# Patient Record
Sex: FEMALE | Race: OTHER | HISPANIC OR LATINO | URBAN - METROPOLITAN AREA
[De-identification: names, ages, dates, MRNs, and addresses within clinical notes are randomized per-mention and may not be internally consistent; named-entity substitution may affect disease eponyms.]

---
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
## 2019-09-25 ENCOUNTER — EMERGENCY (EMERGENCY)
Facility: HOSPITAL | Age: 19
LOS: 1 days | Discharge: HOME | End: 2019-09-25
Attending: EMERGENCY MEDICINE
Payer: SELF-PAY

## 2019-09-25 VITALS — HEIGHT: 61 IN | WEIGHT: 139.99 LBS

## 2019-09-25 VITALS
DIASTOLIC BLOOD PRESSURE: 55 MMHG | HEART RATE: 62 BPM | SYSTOLIC BLOOD PRESSURE: 109 MMHG | TEMPERATURE: 99 F | RESPIRATION RATE: 16 BRPM | OXYGEN SATURATION: 98 %

## 2019-09-25 LAB
ALBUMIN SERPL ELPH-MCNC: 4.8 G/DL — SIGNIFICANT CHANGE UP (ref 3.3–5.2)
ALP SERPL-CCNC: 63 U/L — SIGNIFICANT CHANGE UP (ref 40–120)
ALT FLD-CCNC: 12 U/L — SIGNIFICANT CHANGE UP
ANION GAP SERPL CALC-SCNC: 11 MMOL/L — SIGNIFICANT CHANGE UP (ref 5–17)
APPEARANCE UR: CLEAR — SIGNIFICANT CHANGE UP
APTT BLD: 27.7 SEC — SIGNIFICANT CHANGE UP (ref 27.5–36.3)
AST SERPL-CCNC: 19 U/L — SIGNIFICANT CHANGE UP
BACTERIA # UR AUTO: ABNORMAL
BASOPHILS # BLD AUTO: 0.07 K/UL — SIGNIFICANT CHANGE UP (ref 0–0.2)
BASOPHILS NFR BLD AUTO: 0.5 % — SIGNIFICANT CHANGE UP (ref 0–2)
BILIRUB SERPL-MCNC: 0.3 MG/DL — LOW (ref 0.4–2)
BILIRUB UR-MCNC: NEGATIVE — SIGNIFICANT CHANGE UP
BUN SERPL-MCNC: 12 MG/DL — SIGNIFICANT CHANGE UP (ref 8–20)
CALCIUM SERPL-MCNC: 9.3 MG/DL — SIGNIFICANT CHANGE UP (ref 8.6–10.2)
CHLORIDE SERPL-SCNC: 105 MMOL/L — SIGNIFICANT CHANGE UP (ref 98–107)
CO2 SERPL-SCNC: 24 MMOL/L — SIGNIFICANT CHANGE UP (ref 22–29)
COLOR SPEC: YELLOW — SIGNIFICANT CHANGE UP
CREAT SERPL-MCNC: 0.48 MG/DL — LOW (ref 0.5–1.3)
DIFF PNL FLD: ABNORMAL
EOSINOPHIL # BLD AUTO: 0.02 K/UL — SIGNIFICANT CHANGE UP (ref 0–0.5)
EOSINOPHIL NFR BLD AUTO: 0.1 % — SIGNIFICANT CHANGE UP (ref 0–6)
EPI CELLS # UR: SIGNIFICANT CHANGE UP
GLUCOSE SERPL-MCNC: 99 MG/DL — SIGNIFICANT CHANGE UP (ref 70–115)
GLUCOSE UR QL: NEGATIVE MG/DL — SIGNIFICANT CHANGE UP
HCG SERPL-ACNC: <4 MIU/ML — SIGNIFICANT CHANGE UP
HCT VFR BLD CALC: 39.5 % — SIGNIFICANT CHANGE UP (ref 34.5–45)
HGB BLD-MCNC: 12.9 G/DL — SIGNIFICANT CHANGE UP (ref 11.5–15.5)
IMM GRANULOCYTES NFR BLD AUTO: 0.4 % — SIGNIFICANT CHANGE UP (ref 0–1.5)
INR BLD: 1.09 RATIO — SIGNIFICANT CHANGE UP (ref 0.88–1.16)
KETONES UR-MCNC: NEGATIVE — SIGNIFICANT CHANGE UP
LEUKOCYTE ESTERASE UR-ACNC: NEGATIVE — SIGNIFICANT CHANGE UP
LYMPHOCYTES # BLD AUTO: 1.5 K/UL — SIGNIFICANT CHANGE UP (ref 1–3.3)
LYMPHOCYTES # BLD AUTO: 10.9 % — LOW (ref 13–44)
MCHC RBC-ENTMCNC: 26 PG — LOW (ref 27–34)
MCHC RBC-ENTMCNC: 32.7 GM/DL — SIGNIFICANT CHANGE UP (ref 32–36)
MCV RBC AUTO: 79.5 FL — LOW (ref 80–100)
MONOCYTES # BLD AUTO: 0.99 K/UL — HIGH (ref 0–0.9)
MONOCYTES NFR BLD AUTO: 7.2 % — SIGNIFICANT CHANGE UP (ref 2–14)
NEUTROPHILS # BLD AUTO: 11.09 K/UL — HIGH (ref 1.8–7.4)
NEUTROPHILS NFR BLD AUTO: 80.9 % — HIGH (ref 43–77)
NITRITE UR-MCNC: NEGATIVE — SIGNIFICANT CHANGE UP
PH UR: 6 — SIGNIFICANT CHANGE UP (ref 5–8)
PLATELET # BLD AUTO: 245 K/UL — SIGNIFICANT CHANGE UP (ref 150–400)
POTASSIUM SERPL-MCNC: 3.5 MMOL/L — SIGNIFICANT CHANGE UP (ref 3.5–5.3)
POTASSIUM SERPL-SCNC: 3.5 MMOL/L — SIGNIFICANT CHANGE UP (ref 3.5–5.3)
PROT SERPL-MCNC: 8 G/DL — SIGNIFICANT CHANGE UP (ref 6.6–8.7)
PROT UR-MCNC: 30 MG/DL
PROTHROM AB SERPL-ACNC: 12.6 SEC — SIGNIFICANT CHANGE UP (ref 10–12.9)
RBC # BLD: 4.97 M/UL — SIGNIFICANT CHANGE UP (ref 3.8–5.2)
RBC # FLD: 15.5 % — HIGH (ref 10.3–14.5)
RBC CASTS # UR COMP ASSIST: SIGNIFICANT CHANGE UP /HPF (ref 0–4)
SODIUM SERPL-SCNC: 140 MMOL/L — SIGNIFICANT CHANGE UP (ref 135–145)
SP GR SPEC: 1.02 — SIGNIFICANT CHANGE UP (ref 1.01–1.02)
UROBILINOGEN FLD QL: NEGATIVE MG/DL — SIGNIFICANT CHANGE UP
WBC # BLD: 13.72 K/UL — HIGH (ref 3.8–10.5)
WBC # FLD AUTO: 13.72 K/UL — HIGH (ref 3.8–10.5)
WBC UR QL: SIGNIFICANT CHANGE UP

## 2019-09-25 PROCEDURE — 12051 INTMD RPR FACE/MM 2.5 CM/<: CPT

## 2019-09-25 PROCEDURE — 99284 EMERGENCY DEPT VISIT MOD MDM: CPT | Mod: 25

## 2019-09-25 PROCEDURE — 71260 CT THORAX DX C+: CPT | Mod: 26

## 2019-09-25 PROCEDURE — 72125 CT NECK SPINE W/O DYE: CPT

## 2019-09-25 PROCEDURE — 74177 CT ABD & PELVIS W/CONTRAST: CPT | Mod: 26

## 2019-09-25 PROCEDURE — 71260 CT THORAX DX C+: CPT

## 2019-09-25 PROCEDURE — 71045 X-RAY EXAM CHEST 1 VIEW: CPT

## 2019-09-25 PROCEDURE — 74177 CT ABD & PELVIS W/CONTRAST: CPT

## 2019-09-25 PROCEDURE — 72170 X-RAY EXAM OF PELVIS: CPT

## 2019-09-25 PROCEDURE — 70450 CT HEAD/BRAIN W/O DYE: CPT

## 2019-09-25 PROCEDURE — 73030 X-RAY EXAM OF SHOULDER: CPT | Mod: 26,LT

## 2019-09-25 PROCEDURE — 80053 COMPREHEN METABOLIC PANEL: CPT

## 2019-09-25 PROCEDURE — 73502 X-RAY EXAM HIP UNI 2-3 VIEWS: CPT

## 2019-09-25 PROCEDURE — 72170 X-RAY EXAM OF PELVIS: CPT | Mod: 26

## 2019-09-25 PROCEDURE — 85730 THROMBOPLASTIN TIME PARTIAL: CPT

## 2019-09-25 PROCEDURE — 81001 URINALYSIS AUTO W/SCOPE: CPT

## 2019-09-25 PROCEDURE — 36415 COLL VENOUS BLD VENIPUNCTURE: CPT

## 2019-09-25 PROCEDURE — 71045 X-RAY EXAM CHEST 1 VIEW: CPT | Mod: 26

## 2019-09-25 PROCEDURE — 85610 PROTHROMBIN TIME: CPT

## 2019-09-25 PROCEDURE — 73030 X-RAY EXAM OF SHOULDER: CPT

## 2019-09-25 PROCEDURE — 70450 CT HEAD/BRAIN W/O DYE: CPT | Mod: 26

## 2019-09-25 PROCEDURE — 85027 COMPLETE CBC AUTOMATED: CPT

## 2019-09-25 PROCEDURE — 73502 X-RAY EXAM HIP UNI 2-3 VIEWS: CPT | Mod: 26,LT

## 2019-09-25 PROCEDURE — 72125 CT NECK SPINE W/O DYE: CPT | Mod: 26

## 2019-09-25 PROCEDURE — 84702 CHORIONIC GONADOTROPIN TEST: CPT

## 2019-09-25 RX ORDER — SODIUM CHLORIDE 9 MG/ML
1000 INJECTION INTRAMUSCULAR; INTRAVENOUS; SUBCUTANEOUS ONCE
Refills: 0 | Status: COMPLETED | OUTPATIENT
Start: 2019-09-25 | End: 2019-09-25

## 2019-09-25 RX ADMIN — SODIUM CHLORIDE 1000 MILLILITER(S): 9 INJECTION INTRAMUSCULAR; INTRAVENOUS; SUBCUTANEOUS at 16:10
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
## 2019-09-25 NOTE — ED PROVIDER NOTE - NS ED ROS FT
Constitutional: (-) fever  (-)chills  (-)sweats  Eyes/ENT: (-) blurry vision, (-) epistaxis  (-)rhinorrhea   (-) sore throat    Cardiovascular: (-) chest pain, (-) palpitations (-) edema   Respiratory: (-) cough, (-) shortness of breath   Gastrointestinal: (+)nausea  (-)vomiting, (-) diarrhea  (-) abdominal pain   :  (-)dysuria, (-)frequency, (-)urgency, (-)hematuria  Musculoskeletal: (-) neck pain, (-) back pain, (+) joint pain  Integumentary: (-) rash, (-) edema  Neurological: (+) headache, (-) altered mental status  (-)LOC

## 2019-09-25 NOTE — ED PROVIDER NOTE - NSFOLLOWUPINSTRUCTIONS_ED_ALL_ED_FT
You are advised to please follow up with your primary care doctor within the next 24 hours and return to the Emergency Department for worsening symptoms or any other concerns.  Your doctor may call [phone] to follow up on the specific results of the tests performed today in the emergency department.
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
## 2019-09-25 NOTE — ED PROVIDER NOTE - PHYSICAL EXAMINATION
Return in 1mo for ear recheck and 3mo for 15mo WCC or sooner as needed    Patient Education     Well-Child Checkup: 12 Months     At this age, your baby may take his or her first steps. Although some babies take their first steps when they are younger and some when they are older.      At the 12-month checkup, the healthcare provider will examine the child and ask how things are going at home. This sheet describes some of what you can expect.  Development and milestones  The healthcare provider will ask questions about your child. He or she will observe your toddler to get an idea of the child’s development. By this visit, your child is likely doing some of the following:  · Pulling up to a standing position  · Moving around while holding on to the couch or other furniture (known as “cruising”)  · Taking steps independently  · Putting objects in and takes them out of a container  · Using the first or pointer finger and thumb to grasp small objects  · Starting to understand what you’re saying  · Saying “Mama” and “Goran”  Feeding tips  At 12 months of age, it’s normal for a child to eat 3 meals and a few snacks each day. If your child doesn’t want to eat, that’s OK. Provide food at mealtime, and your child will eat if and when he or she is hungry. Do not force the child to eat. To help your child eat well:  · Gradually give the child whole milk instead of feeding breastmilk or formula. If you’re breastfeeding, continue or wean as you and your child are ready, but also start giving your child whole milk The dietary fat contained in whole milk is necessary for proper brain development and should be given to toddlers from ages 1 to 2 years.  · Make solids your child’s main source of nutrients. Milk should be thought of as a beverage, not a full meal.  · Begin to replace a bottle with a sippy cup for all liquids. Plan to wean your child off the bottle by 15 months of age.  · Avoid foods your child might choke on. This is  common with foods about the size and shape of the child’s throat. They include sections of hot dogs and sausages, hard candies, nuts, whole grapes, and raw vegetables. Ask the healthcare provider about other foods to avoid.  · At 12 months of age it’s OK to give your child honey.  · Ask the healthcare provider if your baby needs fluoride supplements.  Hygiene tips  · If your child has teeth, gently brush them at least twice a day (such as after breakfast and before bed). Use a small amount of fluoride toothpaste (no larger than a grain of rice) and a baby's toothbrush with soft bristles.   · Ask the healthcare provider when your child should have his or her first dental visit. Most pediatric dentists recommend that the first dental visit should happen within 6 months after the first tooth erupts above the gums, but no later than the child's first birthday.   Sleeping tips  At this age, your child will likely nap around 1 to 3 hours each day, and sleep 10 to 12 hours at night. If your child sleeps more or less than this but seems healthy, it is not a concern. To help your child sleep:  · Get the child used to doing the same things each night before bed. Having a bedtime routine helps your child learn when it’s time to go to sleep. Try to stick to the same bedtime each night.  · Do not put your child to bed with anything to drink.  · Make sure the crib mattress is on the lowest setting. This helps keep your child from pulling up and climbing or falling out of the crib. If your child is still able to climb out of the crib, use a crib tent, put the mattress on the floor, or switch to a toddler bed.   · If getting the child to sleep through the night is a problem, ask the healthcare provider for tips.  Safety tips  As your child becomes more mobile, active supervision is crucial. Always be aware of what your child is doing. An accident can happen in a split second. To keep your baby safe:   · If you have not already done  so, childproof the house. If your toddler is pulling up on furniture or cruising (moving around while holding on to objects), be sure that big pieces, such as cabinets and TVs, are tied down or secured to the wall. Otherwise they may be pulled down on top of the child. Move any items that might hurt the child out of his or her reach. Be aware of items like tablecloths or cords that your baby might pull on. Do a safety check of any area your baby spends time in.  · Protect your toddler from falls with sturdy screens on windows and hare at the tops and bottoms of staircases. Supervise your child on the stairs.  · Don’t let your baby get hold of anything small enough to choke on. This includes toys, solid foods, and items on the floor that the child may find while crawling or cruising. As a rule, an item small enough to fit inside a toilet paper tube can cause a child to choke.  · In the car, always put the child in a rear-facing child safety seat in the back seat. Even if your child weighs more than 20 pounds, he or she should still face backward. In fact, it's safest to face backward until age 2 years. Ask the healthcare provider if you have questions.  · At this age many children become curious around dogs, cats, and other animals. Teach your child to be gentle and cautious with animals. Always supervise the child around animals, even familiar family pets.  · Keep this Poison Control phone number in an easy-to-see place, such as on the refrigerator: 111.572.7025.  Vaccines  Based on recommendations from the CDC, at this visit your child may receive the following vaccines:  · Haemophilus influenzae type b  · Hepatitis A  · Hepatitis B  · Influenza (flu)  · Measles, mumps, and rubella  · Pneumococcus  · Polio  · Varicella (chickenpox)  Choosing shoes  Your 1-year-old may be walking. Now is the time to invest in a good pair of shoes. Here are some tips:  · To make sure you get the right size, ask a  for help  measuring your child’s feet. Don’t buy shoes that are too big, for your child to “grow into.” When shoes don’t fit, walking is harder.  · Look for shoes with soft, flexible soles.  · Avoid high ankles and stiff leather. These can be uncomfortable and can interfere with walking.  · Choose shoes that are easy to get on and off, yet won’t slide off your child’s feet accidentally. Moccasins or sneakers with Velcro closures are good choices.        Next checkup at: _______________________________     PARENT NOTES:  Date Last Reviewed: 12/1/2016  © 1329-0372 [employer]. [address] [place] [address], [place] [address]. All rights reserved. This information is not intended as a substitute for professional medical care. Always follow your healthcare professional's instructions.            General:     NAD, well-nourished, well-appearing  Head:     NC/AT, EOMI, oral mucosa moist  Neck:     trachea midline  Lungs:     CTA b/l, no w/r/r  CVS:     S1S2, RRR, no m/g/r  Abd:     +BS, s/nt/nd, no organomegaly  BACK: nt midline @ c/t/l/s spine.     Ext:    2+ radial and pedal pulses    L LE:  ttp @ left hip, from/nt @ left knee, ankle.    R LE: from/nt @ hip, knee, ankle    L UE: ttp @ left shoulder, from.  from/nt @knee, ankle.    R UE: from/nt @ right shoulder, elbow, wrist.   Neuro: AAOx3, no sensory/motor deficits

## 2019-09-25 NOTE — ED PROVIDER NOTE - PATIENT PORTAL LINK FT
You can access the FollowMyHealth Patient Portal offered by [facility] by registering at the following website: [email or web address]. By joining [employer]’s FollowMyHealth portal, you will also be able to view your health information using other applications (apps) compatible with our system.

## 2019-09-25 NOTE — ED PROVIDER NOTE - CARE PROVIDER_API CALL
Beverly Enamorado)  Plastic Surgery; Surgery  [facility]  [address] [place] [address]  Phone: [phone]  Fax: [phone]  Follow Up Time: 1-3 Days
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
## 2019-09-25 NOTE — ED PROVIDER NOTE - CARE PLAN
Principal Discharge DX:	Closed head injury with concussion Principal Discharge DX:	Closed head injury with concussion  Secondary Diagnosis:	Laceration of ear canal, right, initial encounter

## 2019-09-25 NOTE — ED ADULT NURSE REASSESSMENT NOTE - NS ED NURSE REASSESS COMMENT FT1
LATE NOTE  Report given at change of shift to oncoming RN Michell Wilkes and pt endorsed to same for follow up and continuity of care.

## 2020-07-04 ENCOUNTER — TRANSCRIPTION ENCOUNTER (OUTPATIENT)
Age: 20
End: 2020-07-04

## 2023-06-02 ENCOUNTER — NON-APPOINTMENT (OUTPATIENT)
Age: 23
End: 2023-06-02

## 2023-10-02 ENCOUNTER — APPOINTMENT (OUTPATIENT)
Dept: OTOLARYNGOLOGY | Facility: CLINIC | Age: 23
End: 2023-10-02

## 2023-10-02 PROBLEM — Z00.00 ENCOUNTER FOR PREVENTIVE HEALTH EXAMINATION: Status: ACTIVE | Noted: 2023-10-02

## 2025-03-28 ENCOUNTER — NON-APPOINTMENT (OUTPATIENT)
Age: 25
End: 2025-03-28

## 2025-05-24 NOTE — ED ADULT NURSE NOTE - PAIN: BODY LOCATION
Continue home medication except for HCTZ 25 mg     left neck,. left arm and left thigh and right ear